# Patient Record
Sex: MALE | URBAN - METROPOLITAN AREA
[De-identification: names, ages, dates, MRNs, and addresses within clinical notes are randomized per-mention and may not be internally consistent; named-entity substitution may affect disease eponyms.]

---

## 2023-03-07 ENCOUNTER — APPOINTMENT (RX ONLY)
Dept: URBAN - METROPOLITAN AREA CLINIC 38 | Facility: CLINIC | Age: 53
Setting detail: DERMATOLOGY
End: 2023-03-07

## 2023-03-07 DIAGNOSIS — Z71.89 OTHER SPECIFIED COUNSELING: ICD-10-CM

## 2023-03-07 DIAGNOSIS — L57.8 OTHER SKIN CHANGES DUE TO CHRONIC EXPOSURE TO NONIONIZING RADIATION: ICD-10-CM

## 2023-03-07 DIAGNOSIS — L81.4 OTHER MELANIN HYPERPIGMENTATION: ICD-10-CM

## 2023-03-07 DIAGNOSIS — L82.1 OTHER SEBORRHEIC KERATOSIS: ICD-10-CM

## 2023-03-07 DIAGNOSIS — L91.8 OTHER HYPERTROPHIC DISORDERS OF THE SKIN: ICD-10-CM

## 2023-03-07 DIAGNOSIS — D18.0 HEMANGIOMA: ICD-10-CM

## 2023-03-07 DIAGNOSIS — L57.0 ACTINIC KERATOSIS: ICD-10-CM

## 2023-03-07 DIAGNOSIS — D22 MELANOCYTIC NEVI: ICD-10-CM

## 2023-03-07 DIAGNOSIS — L85.3 XEROSIS CUTIS: ICD-10-CM

## 2023-03-07 PROBLEM — D18.01 HEMANGIOMA OF SKIN AND SUBCUTANEOUS TISSUE: Status: ACTIVE | Noted: 2023-03-07

## 2023-03-07 PROBLEM — D22.9 MELANOCYTIC NEVI, UNSPECIFIED: Status: ACTIVE | Noted: 2023-03-07

## 2023-03-07 PROCEDURE — ? COUNSELING

## 2023-03-07 PROCEDURE — ? LIQUID NITROGEN

## 2023-03-07 PROCEDURE — 99203 OFFICE O/P NEW LOW 30 MIN: CPT | Mod: 25

## 2023-03-07 PROCEDURE — ? SKIN TAG REMOVAL (COSMETIC)

## 2023-03-07 PROCEDURE — 17000 DESTRUCT PREMALG LESION: CPT

## 2023-03-07 ASSESSMENT — LOCATION ZONE DERM
LOCATION ZONE: HAND
LOCATION ZONE: NECK
LOCATION ZONE: TRUNK
LOCATION ZONE: ARM
LOCATION ZONE: LIP
LOCATION ZONE: FACE

## 2023-03-07 ASSESSMENT — LOCATION SIMPLE DESCRIPTION DERM
LOCATION SIMPLE: RIGHT UPPER BACK
LOCATION SIMPLE: RIGHT LIP
LOCATION SIMPLE: ABDOMEN
LOCATION SIMPLE: RIGHT FOREARM
LOCATION SIMPLE: LEFT FOREARM
LOCATION SIMPLE: LEFT ANTERIOR NECK
LOCATION SIMPLE: LEFT FOREHEAD
LOCATION SIMPLE: POSTERIOR NECK
LOCATION SIMPLE: LEFT UPPER BACK
LOCATION SIMPLE: RIGHT HAND
LOCATION SIMPLE: INFERIOR FOREHEAD

## 2023-03-07 ASSESSMENT — LOCATION DETAILED DESCRIPTION DERM
LOCATION DETAILED: PERIUMBILICAL SKIN
LOCATION DETAILED: RIGHT MEDIAL TRAPEZIAL NECK
LOCATION DETAILED: LEFT CLAVICULAR NECK
LOCATION DETAILED: INFERIOR MID FOREHEAD
LOCATION DETAILED: RIGHT INFERIOR VERMILION LIP
LOCATION DETAILED: LEFT SUPERIOR FOREHEAD
LOCATION DETAILED: LEFT SUPERIOR UPPER BACK
LOCATION DETAILED: RIGHT INFERIOR UPPER BACK
LOCATION DETAILED: RIGHT RADIAL DORSAL HAND
LOCATION DETAILED: LEFT DISTAL DORSAL FOREARM
LOCATION DETAILED: RIGHT PROXIMAL DORSAL FOREARM

## 2023-03-07 NOTE — PROCEDURE: SKIN TAG REMOVAL (COSMETIC)
Price (Use Numbers Only, No Special Characters Or $): 50
Detail Level: Detailed
Removed With: gradle excision
Anesthesia Volume In Cc: 1
Consent: Written consent obtained and the risks of skin tag removal was reviewed with the patient including but not limited to bleeding, pigmentary change, infection, pain, and remote possibility of scarring.
Anesthesia Type: 1% lidocaine without epinephrine and a 1:10 solution of 8.4% sodium bicarbonate

## 2023-10-10 ENCOUNTER — APPOINTMENT (RX ONLY)
Dept: URBAN - METROPOLITAN AREA CLINIC 38 | Facility: CLINIC | Age: 53
Setting detail: DERMATOLOGY
End: 2023-10-10

## 2023-10-10 DIAGNOSIS — L98.8 OTHER SPECIFIED DISORDERS OF THE SKIN AND SUBCUTANEOUS TISSUE: ICD-10-CM

## 2023-10-10 DIAGNOSIS — L57.8 OTHER SKIN CHANGES DUE TO CHRONIC EXPOSURE TO NONIONIZING RADIATION: ICD-10-CM

## 2023-10-10 PROCEDURE — 99213 OFFICE O/P EST LOW 20 MIN: CPT

## 2023-10-10 PROCEDURE — ? PRESCRIPTION

## 2023-10-10 PROCEDURE — ? COUNSELING

## 2023-10-10 PROCEDURE — ? ADDITIONAL NOTES

## 2023-10-10 RX ORDER — FLUOROURACIL 2 G/40G
CREAM TOPICAL QD-BID
Qty: 40 | Refills: 2 | Status: ERX | COMMUNITY
Start: 2023-10-10

## 2023-10-10 RX ADMIN — FLUOROURACIL: 2 CREAM TOPICAL at 00:00

## 2023-10-10 ASSESSMENT — LOCATION SIMPLE DESCRIPTION DERM: LOCATION SIMPLE: RIGHT LIP

## 2023-10-10 ASSESSMENT — LOCATION ZONE DERM: LOCATION ZONE: LIP

## 2023-10-10 ASSESSMENT — LOCATION DETAILED DESCRIPTION DERM: LOCATION DETAILED: RIGHT INFERIOR VERMILION LIP

## 2023-10-10 NOTE — PROCEDURE: ADDITIONAL NOTES
Additional Notes: Patient instructed to use Dr. Aguirre’s cortibalm several times a day for 1 week. \\nDaily lip balm with SPF.
Render Risk Assessment In Note?: no
Detail Level: Simple

## 2023-10-10 NOTE — PROCEDURE: COUNSELING
Detail Level: Simple
Patient Specific Counseling (Will Not Stick From Patient To Patient): Patient instructed to begin treatment after treating lesion with cortibalm.
Detail Level: Zone

## 2024-02-09 ENCOUNTER — TELEPHONE (OUTPATIENT)
Dept: CARDIOLOGY | Facility: MEDICAL CENTER | Age: 54
End: 2024-02-09
Payer: COMMERCIAL

## 2024-02-09 NOTE — TELEPHONE ENCOUNTER
Did patient answer the phone? YES    Was a voice mail left? NO     Has patient had labs, imaging, or cardiac testing outside [facility]? YES    Where has patient had labs, imaging, or cardiac testing done? [facility]    Did MA fax for records request? YES    Fax number used to request records [phone]

## 2024-02-14 ENCOUNTER — OFFICE VISIT (OUTPATIENT)
Dept: CARDIOLOGY | Facility: MEDICAL CENTER | Age: 54
End: 2024-02-14
Attending: INTERNAL MEDICINE
Payer: COMMERCIAL

## 2024-02-14 VITALS
RESPIRATION RATE: 16 BRPM | HEART RATE: 72 BPM | OXYGEN SATURATION: 97 % | DIASTOLIC BLOOD PRESSURE: 76 MMHG | HEIGHT: 75 IN | SYSTOLIC BLOOD PRESSURE: 130 MMHG | WEIGHT: 226 LBS | BODY MASS INDEX: 28.1 KG/M2

## 2024-02-14 DIAGNOSIS — R94.31 NONSPECIFIC ABNORMAL ELECTROCARDIOGRAM (ECG) (EKG): ICD-10-CM

## 2024-02-14 DIAGNOSIS — I49.9 CARDIAC ARRHYTHMIA, UNSPECIFIED CARDIAC ARRHYTHMIA TYPE: ICD-10-CM

## 2024-02-14 DIAGNOSIS — R93.1 ABNORMAL ECHOCARDIOGRAM: ICD-10-CM

## 2024-02-14 LAB — EKG IMPRESSION: NORMAL

## 2024-02-14 PROCEDURE — 99211 OFF/OP EST MAY X REQ PHY/QHP: CPT | Performed by: INTERNAL MEDICINE

## 2024-02-14 PROCEDURE — 93005 ELECTROCARDIOGRAM TRACING: CPT | Performed by: INTERNAL MEDICINE

## 2024-02-14 PROCEDURE — 3078F DIAST BP <80 MM HG: CPT | Performed by: INTERNAL MEDICINE

## 2024-02-14 PROCEDURE — 99204 OFFICE O/P NEW MOD 45 MIN: CPT | Performed by: INTERNAL MEDICINE

## 2024-02-14 PROCEDURE — 93010 ELECTROCARDIOGRAM REPORT: CPT | Performed by: INTERNAL MEDICINE

## 2024-02-14 PROCEDURE — 3075F SYST BP GE 130 - 139MM HG: CPT | Performed by: INTERNAL MEDICINE

## 2024-02-14 RX ORDER — LEVALBUTEROL TARTRATE 45 UG/1
AEROSOL, METERED ORAL
COMMUNITY
Start: 2024-01-07

## 2024-02-14 RX ORDER — TADALAFIL 2.5 MG/1
0.5 TABLET ORAL DAILY
COMMUNITY

## 2024-02-14 RX ORDER — TRAZODONE HYDROCHLORIDE 50 MG/1
TABLET ORAL
COMMUNITY
Start: 2024-01-18 | End: 2024-02-14

## 2024-02-14 RX ORDER — VALACYCLOVIR HYDROCHLORIDE 500 MG/1
TABLET, FILM COATED ORAL PRN
COMMUNITY
Start: 2024-01-11

## 2024-02-14 RX ORDER — FLUOROURACIL 50 MG/G
CREAM TOPICAL
COMMUNITY
Start: 2024-02-11 | End: 2024-02-14

## 2024-02-14 ASSESSMENT — ENCOUNTER SYMPTOMS
WEAKNESS: 0
FOCAL WEAKNESS: 0
BRUISES/BLEEDS EASILY: 0
EYES NEGATIVE: 1
DEPRESSION: 0
CLAUDICATION: 0
NAUSEA: 0
WEIGHT LOSS: 0
HEADACHES: 0
COUGH: 0
CHILLS: 0
FEVER: 0
MYALGIAS: 0
CARDIOVASCULAR NEGATIVE: 1
DOUBLE VISION: 0
PALPITATIONS: 0
GASTROINTESTINAL NEGATIVE: 1
DIZZINESS: 0
CONSTITUTIONAL NEGATIVE: 1
PSYCHIATRIC NEGATIVE: 1
BLURRED VISION: 0
RESPIRATORY NEGATIVE: 1
NEUROLOGICAL NEGATIVE: 1
MUSCULOSKELETAL NEGATIVE: 1
NERVOUS/ANXIOUS: 0
VOMITING: 0
ABDOMINAL PAIN: 0
SHORTNESS OF BREATH: 0

## 2024-02-14 NOTE — PROGRESS NOTES
Chief Complaint   Patient presents with    Arrhythmia     New patient       Subjective     Bernardo To is a 53 y.o. male who presents today for new patient establishment for abnormal echocardiogram.    Mr. Storm is a 53 year old male without cardiac history, lifelong nonsmoker, family history of coronary artery disease with his maternal grandfather. He underwent an echocardiogram at [facility] which revealed mildly reduced left ventricular systolic function. He is clinically doing well. He denies chest pain, shortness of breath, palpitations, nausea/vomiting or diaphoresis. He is active with mountain sports. His father is doctor and he has many doctor friends still in [place]. 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     History reviewed. No pertinent past medical history.    Past Surgical History:   Procedure Laterality Date    CERVICAL FUSION POSTERIOR      SHOULDER SURGERY       Family History   Problem Relation Age of Onset    Stroke Father     Heart Disease Maternal Grandfather     Heart Attack Maternal Grandfather      Social History     Socioeconomic History    Marital status:      Spouse name: Not on file    Number of children: Not on file    Years of education: Not on file    Highest education level: Not on file   Occupational History    Not on file   Tobacco Use    Smoking status: Never    Smokeless tobacco: Never   Vaping Use    Vaping Use: Never used   Substance and Sexual Activity    Alcohol use: Not Currently    Drug use: Never    Sexual activity: Not on file   Other Topics Concern    Not on file   Social History Narrative    Not on file     Social Determinants of Health     Financial Resource Strain: Not on file   Food Insecurity: Not on file   Transportation Needs: Not on file   Physical Activity: Not on file   Stress: Not on file   Social Connections: Not on file   Intimate Partner Violence: Not on file   Housing Stability: Not on file     No Known Allergies    (Medications  "reviewed.)  Outpatient Encounter Medications as of 2/14/2024   Medication Sig Dispense Refill    levalbuterol (XOPENEX HFA) 45 MCG/ACT inhaler       valACYclovir (VALTREX) 500 MG Tab as needed.      Tadalafil 2.5 MG Tab Take 0.5 mg by mouth every day.        [DISCONTINUED] fluorouracil (EFUDEX) 5 % cream  (Patient not taking: Reported on 2/14/2024)      [DISCONTINUED] traZODone (DESYREL) 50 MG Tab  (Patient not taking: Reported on 2/14/2024)       No facility-administered encounter medications on file as of 2/14/2024.     Review of Systems   Constitutional: Negative.  Negative for chills, fever, malaise/fatigue and weight loss.   HENT: Negative.  Negative for hearing loss.    Eyes: Negative.  Negative for blurred vision and double vision.   Respiratory: Negative.  Negative for cough and shortness of breath.    Cardiovascular: Negative.  Negative for chest pain, palpitations, claudication and leg swelling.   Gastrointestinal: Negative.  Negative for abdominal pain, nausea and vomiting.   Genitourinary: Negative.  Negative for dysuria and urgency.   Musculoskeletal: Negative.  Negative for joint pain and myalgias.   Skin: Negative.  Negative for itching and rash.   Neurological: Negative.  Negative for dizziness, focal weakness, weakness and headaches.   Endo/Heme/Allergies: Negative.  Does not bruise/bleed easily.   Psychiatric/Behavioral: Negative.  Negative for depression. The patient is not nervous/anxious.               Objective     /76 (BP Location: Left arm, Patient Position: Sitting)   Pulse 72   Resp 16   Ht 1.905 m (6' 3\")   Wt 103 kg (226 lb)   SpO2 97%   BMI 28.25 kg/m²     Physical Exam  Constitutional:       Appearance: Normal appearance. He is well-developed and normal weight.   HENT:      Head: Normocephalic and atraumatic.   Neck:      Vascular: No JVD.   Cardiovascular:      Rate and Rhythm: Normal rate and regular rhythm.      Heart sounds: Normal heart sounds.   Pulmonary:      Effort: " Pulmonary effort is normal.      Breath sounds: Normal breath sounds.   Abdominal:      General: Bowel sounds are normal.      Palpations: Abdomen is soft.      Comments: No hepatosplenomegaly.   Musculoskeletal:         General: Normal range of motion.   Lymphadenopathy:      Cervical: No cervical adenopathy.   Skin:     General: Skin is warm and dry.   Neurological:      Mental Status: He is alert and oriented to person, place, and time.            CARDIAC STUDIES/PROCEDURES:    ECHOCARDIOGRAM CONCLUSIONS at [facility] (02/01/24)  1. Low normal left ventricular size with moderate asymmetric hypertrophy and low normal systolic function.   The ejection fraction is 51.0%   2. Mitral leaflet and cortical thickening without mitral stenosis or regurgitation.   3. Right ventricular systolic pressure is normal.   Normal estimated central venous pressure.   4. No prior studies.   (study result reviewed)     EKG was ordered for abnormal echocardiogram, performed on (02/14/24) was reviewed: EKG, personally interpreted shows sinus rhythm with non-specific intra-ventricular conduction delay.     Assessment & Plan     1. Abnormal echocardiogram        2. Nonspecific abnormal electrocardiogram (ECG) (EKG)        3. Cardiac arrhythmia, unspecified cardiac arrhythmia type  EKG          Medical Decision Making: Today's Assessment/Status/Plan:        Abnormal echocardiogram: The recent echocardiogram is abnormal as described above. We will repeat an echocardiogram.    We will follow up in 3 months.    CC Yudy Barrera

## 2024-03-04 ENCOUNTER — APPOINTMENT (OUTPATIENT)
Dept: CARDIOLOGY | Facility: MEDICAL CENTER | Age: 54
End: 2024-03-04
Attending: INTERNAL MEDICINE
Payer: COMMERCIAL

## 2024-03-19 ENCOUNTER — HOSPITAL ENCOUNTER (OUTPATIENT)
Dept: CARDIOLOGY | Facility: MEDICAL CENTER | Age: 54
End: 2024-03-19
Attending: INTERNAL MEDICINE
Payer: COMMERCIAL

## 2024-03-19 DIAGNOSIS — R93.1 ABNORMAL ECHOCARDIOGRAM: ICD-10-CM

## 2024-03-19 DIAGNOSIS — R94.31 NONSPECIFIC ABNORMAL ELECTROCARDIOGRAM (ECG) (EKG): ICD-10-CM

## 2024-03-19 PROCEDURE — 700117 HCHG RX CONTRAST REV CODE 255: Performed by: INTERNAL MEDICINE

## 2024-03-19 PROCEDURE — 93306 TTE W/DOPPLER COMPLETE: CPT

## 2024-03-19 RX ADMIN — HUMAN ALBUMIN MICROSPHERES AND PERFLUTREN 3 ML: 10; .22 INJECTION, SOLUTION INTRAVENOUS at 09:53

## 2024-03-20 LAB
LV EJECT FRACT  99904: 55
LV EJECT FRACT MOD 2C 99903: 37.63
LV EJECT FRACT MOD 4C 99902: 48.66
LV EJECT FRACT MOD BP 99901: 42.5

## 2024-03-20 PROCEDURE — 93306 TTE W/DOPPLER COMPLETE: CPT | Mod: 26 | Performed by: INTERNAL MEDICINE

## 2024-04-17 ENCOUNTER — OFFICE VISIT (OUTPATIENT)
Dept: CARDIOLOGY | Facility: MEDICAL CENTER | Age: 54
End: 2024-04-17
Attending: INTERNAL MEDICINE
Payer: COMMERCIAL

## 2024-04-17 VITALS
SYSTOLIC BLOOD PRESSURE: 128 MMHG | DIASTOLIC BLOOD PRESSURE: 90 MMHG | WEIGHT: 226 LBS | HEART RATE: 68 BPM | HEIGHT: 75 IN | OXYGEN SATURATION: 97 % | RESPIRATION RATE: 16 BRPM | BODY MASS INDEX: 28.1 KG/M2

## 2024-04-17 DIAGNOSIS — R93.1 ABNORMAL ECHOCARDIOGRAM: ICD-10-CM

## 2024-04-17 DIAGNOSIS — G47.36 HYPOXEMIA ASSOCIATED WITH SLEEP: ICD-10-CM

## 2024-04-17 PROCEDURE — 99211 OFF/OP EST MAY X REQ PHY/QHP: CPT | Performed by: INTERNAL MEDICINE

## 2024-04-17 PROCEDURE — 3080F DIAST BP >= 90 MM HG: CPT | Performed by: INTERNAL MEDICINE

## 2024-04-17 PROCEDURE — 3074F SYST BP LT 130 MM HG: CPT | Performed by: INTERNAL MEDICINE

## 2024-04-17 PROCEDURE — 99214 OFFICE O/P EST MOD 30 MIN: CPT | Performed by: INTERNAL MEDICINE

## 2024-04-17 ASSESSMENT — ENCOUNTER SYMPTOMS
BRUISES/BLEEDS EASILY: 0
COUGH: 0
FOCAL WEAKNESS: 0
CHILLS: 0
NAUSEA: 0
SHORTNESS OF BREATH: 0
DOUBLE VISION: 0
MUSCULOSKELETAL NEGATIVE: 1
GASTROINTESTINAL NEGATIVE: 1
NEUROLOGICAL NEGATIVE: 1
CLAUDICATION: 0
PALPITATIONS: 0
FEVER: 0
WEAKNESS: 0
MYALGIAS: 0
VOMITING: 0
CARDIOVASCULAR NEGATIVE: 1
DEPRESSION: 0
NERVOUS/ANXIOUS: 0
CONSTITUTIONAL NEGATIVE: 1
WEIGHT LOSS: 0
BLURRED VISION: 0
HEADACHES: 0
ABDOMINAL PAIN: 0
PSYCHIATRIC NEGATIVE: 1
DIZZINESS: 0
RESPIRATORY NEGATIVE: 1
EYES NEGATIVE: 1

## 2024-04-17 NOTE — PROGRESS NOTES
Chief Complaint   Patient presents with    Abnormal EKG     F/v Dx: Nonspecific abnormal electrocardiogram (ECG) (EKG)         Subjective     Bernardo To is a 54 y.o. male who presents today for follow up of abnormal echocardiogram, EKG.    Since the patient's last visit on 02/14/24, he has been doing well clinically. He denies chest pain, shortness of breath, palpitations, nausea/vomiting or diaphoresis. He keeps active exercising.     History reviewed. No pertinent past medical history.  Past Surgical History:   Procedure Laterality Date    CERVICAL FUSION POSTERIOR      SHOULDER SURGERY       Family History   Problem Relation Age of Onset    Stroke Father     Heart Disease Maternal Grandfather     Heart Attack Maternal Grandfather      Social History     Socioeconomic History    Marital status:      Spouse name: Not on file    Number of children: Not on file    Years of education: Not on file    Highest education level: Not on file   Occupational History    Not on file   Tobacco Use    Smoking status: Never    Smokeless tobacco: Never   Vaping Use    Vaping Use: Never used   Substance and Sexual Activity    Alcohol use: Not Currently    Drug use: Never    Sexual activity: Not on file   Other Topics Concern    Not on file   Social History Narrative    Not on file     Social Determinants of Health     Financial Resource Strain: Not on file   Food Insecurity: Not on file   Transportation Needs: Not on file   Physical Activity: Not on file   Stress: Not on file   Social Connections: Not on file   Intimate Partner Violence: Not on file   Housing Stability: Not on file     No Known Allergies    (Medications reviewed.)  Outpatient Encounter Medications as of 4/17/2024   Medication Sig Dispense Refill    levalbuterol (XOPENEX HFA) 45 MCG/ACT inhaler       valACYclovir (VALTREX) 500 MG Tab as needed.      Tadalafil 2.5 MG Tab Take 0.5 mg by mouth every day.         No facility-administered encounter  "medications on file as of 4/17/2024.     Review of Systems   Constitutional: Negative.  Negative for chills, fever, malaise/fatigue and weight loss.   HENT: Negative.  Negative for hearing loss.    Eyes: Negative.  Negative for blurred vision and double vision.   Respiratory: Negative.  Negative for cough and shortness of breath.    Cardiovascular: Negative.  Negative for chest pain, palpitations, claudication and leg swelling.   Gastrointestinal: Negative.  Negative for abdominal pain, nausea and vomiting.   Genitourinary: Negative.  Negative for dysuria and urgency.   Musculoskeletal: Negative.  Negative for joint pain and myalgias.   Skin: Negative.  Negative for itching and rash.   Neurological: Negative.  Negative for dizziness, focal weakness, weakness and headaches.   Endo/Heme/Allergies: Negative.  Does not bruise/bleed easily.   Psychiatric/Behavioral: Negative.  Negative for depression. The patient is not nervous/anxious.               Objective     BP (!) 128/90 (BP Location: Left arm, Patient Position: Sitting, BP Cuff Size: Adult)   Pulse 68   Resp 16   Ht 1.905 m (6' 3\")   Wt 103 kg (226 lb)   SpO2 97%   BMI 28.25 kg/m²     Physical Exam  Constitutional:       Appearance: Normal appearance. He is well-developed and normal weight.   HENT:      Head: Normocephalic and atraumatic.   Neck:      Vascular: No JVD.   Cardiovascular:      Rate and Rhythm: Normal rate and regular rhythm.      Heart sounds: Normal heart sounds.   Pulmonary:      Effort: Pulmonary effort is normal.      Breath sounds: Normal breath sounds.   Abdominal:      General: Bowel sounds are normal.      Palpations: Abdomen is soft.      Comments: No hepatosplenomegaly.   Musculoskeletal:         General: Normal range of motion.   Lymphadenopathy:      Cervical: No cervical adenopathy.   Skin:     General: Skin is warm and dry.   Neurological:      Mental Status: He is alert and oriented to person, place, and time.          "   CARDIAC STUDIES/PROCEDURES:    ECHOCARDIOGRAM CONCLUSIONS (03/19/24)  No prior study is available for comparison.   Low normal left ventricular systolic function.  The left ventricular ejection fraction is visually estimated to be 55%.  The right ventricle is normal in size and systolic function.  No significant valvular abnormalities.   Estimated right ventricular systolic pressure is 25 mmHg.  (study result reviewed)      ECHOCARDIOGRAM CONCLUSIONS at [facility] (02/01/24)  1. Low normal left ventricular size with moderate asymmetric hypertrophy and low normal systolic function.   The ejection fraction is 51.0%   2. Mitral leaflet and cortical thickening without mitral stenosis or regurgitation.   3. Right ventricular systolic pressure is normal.   Normal estimated central venous pressure.   4. No prior studies.     EKG performed on (02/14/24) EKG shows sinus rhythm with non-specific intra-ventricular conduction delay.     Assessment & Plan     1. Abnormal echocardiogram        2. Hypoxemia associated with sleep            Medical Decision Making: Today's Assessment/Status/Plan:        Abnormal echocardiogram: The outside echocardiogram was read abnormal, however, repeat study here shows structurally normal heart.  Hypoxemia with sleep: We will refer to [facility].   Health maintenance: He initially underwent an echocardiogram at [facility] which revealed mildly reduced left ventricular systolic function. He is active with mountain sports.   Additional information: His father is doctor and he has many doctor friends still in [place]. 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     We will see the patient as needed.    CC Dr. Young, Yudy

## 2024-05-16 ENCOUNTER — TELEPHONE (OUTPATIENT)
Dept: HEALTH INFORMATION MANAGEMENT | Facility: OTHER | Age: 54
End: 2024-05-16
Payer: COMMERCIAL